# Patient Record
(demographics unavailable — no encounter records)

---

## 2024-12-06 NOTE — HISTORY OF PRESENT ILLNESS
[FreeTextEntry1] : I saw this patient in the office today.  As you recall, she described headaches. This has been going on for many years. It has become almost daily. It waxes and wanes in intensity. When severe it is associated with nausea, dizziness, and photophobia.  12/6/2024 visit: I had started her on nortriptyline for prophylaxis. She reports that the headaches are significantly improved since starting the medication.

## 2024-12-06 NOTE — CONSULT LETTER
[Dear  ___] : Dear  [unfilled], [Courtesy Letter:] : I had the pleasure of seeing your patient, [unfilled], in my office today. [Please see my note below.] : Please see my note below. [Consult Closing:] : Thank you very much for allowing me to participate in the care of this patient.  If you have any questions, please do not hesitate to contact me. [Sincerely,] : Sincerely, [FreeTextEntry3] : Terell Caimlo MD.

## 2024-12-06 NOTE — ASSESSMENT
[FreeTextEntry1] : This is a 29-year-old woman with a long history of headache that has evolved into a more chronic daily pattern. Likely this represents migraine. Brain imaging was unremarkable.  I had started her on nortriptyline for prophylaxis. I will continue the current regimen.  I will see her back in 6 months.